# Patient Record
Sex: FEMALE | Race: ASIAN | NOT HISPANIC OR LATINO | URBAN - METROPOLITAN AREA
[De-identification: names, ages, dates, MRNs, and addresses within clinical notes are randomized per-mention and may not be internally consistent; named-entity substitution may affect disease eponyms.]

---

## 2019-10-29 ENCOUNTER — EMERGENCY (EMERGENCY)
Facility: HOSPITAL | Age: 24
LOS: 1 days | Discharge: HOME | End: 2019-10-29
Attending: EMERGENCY MEDICINE
Payer: COMMERCIAL

## 2019-10-29 VITALS
OXYGEN SATURATION: 100 % | HEART RATE: 82 BPM | TEMPERATURE: 99 F | SYSTOLIC BLOOD PRESSURE: 133 MMHG | DIASTOLIC BLOOD PRESSURE: 90 MMHG | RESPIRATION RATE: 20 BRPM

## 2019-10-29 VITALS
DIASTOLIC BLOOD PRESSURE: 79 MMHG | HEIGHT: 63 IN | SYSTOLIC BLOOD PRESSURE: 121 MMHG | HEART RATE: 88 BPM | TEMPERATURE: 98 F | RESPIRATION RATE: 20 BRPM | OXYGEN SATURATION: 99 % | WEIGHT: 154.98 LBS

## 2019-10-29 PROCEDURE — 99283 EMERGENCY DEPT VISIT LOW MDM: CPT

## 2019-10-29 RX ADMIN — Medication 50 MILLIGRAM(S): at 08:09

## 2019-10-29 NOTE — ED PROVIDER NOTE - PATIENT PORTAL LINK FT
You can access the FollowMyHealth Patient Portal offered by [facility] by registering at the following website: [email or web address]. By joining [employer]’s FollowMyHealth portal, you will also be able to view your health information using other applications (apps) compatible with our system.

## 2019-10-29 NOTE — ED PROVIDER NOTE - NSFOLLOWUPINSTRUCTIONS_ED_ALL_ED_FT
1. You were seen in the Emergency Room for rash  2. You may take Prednisone 50 mg once a day for 4 more days (you received one dose in the ER, to complete a 5 day course)  3. Please follow up with your doctor in 24-48 hours. If symptoms do not improve, please follow up with a dermatologist (referral provided to you)  4. Return to the emergency room or seek immediate assistance for any new or concerning symptoms (such as fevers, chills,  worsening rash, difficulty breathing, shortness of breath, hoarse voice ), or if you get worse.   5. Copies of your tests were provided to you for follow-up.  You must address all your findings with your doctor.

## 2019-10-29 NOTE — ED PROVIDER NOTE - CONDITION AT DISCHARGE:
"Requested Prescriptions   Pending Prescriptions Disp Refills     SUMAtriptan (IMITREX) 50 MG tablet [Pharmacy Med Name: SUMATRIPTAN 50MG TABLETS]    Last Written Prescription Date:  12/1/2017  Last Fill Quantity: 18,  # refills: 3   Last office visit: 12/1/2017 with prescribing provider:  Linda Martin     Future Office Visit:     18 tablet 0     Sig: TAKE 1 TO 2 TABLETS(50  MG) BY MOUTH AT ONSET OF HEADACHE FOR MIGRAINE. MAY REPEAT IN 2 HOURS. MAX 4 TABLETS/ 24 HOURS    Serotonin Agonists Failed - 12/19/2018  3:00 PM       Failed - Serotonin Agonist request needs review.    Please review patient's record. If patient has had 8 or more treatments in the past month, please forward to provider.         Failed - Recent (12 mo) or future (30 days) visit within the authorizing provider's specialty    Patient had office visit in the last 12 months or has a visit in the next 30 days with authorizing provider or within the authorizing provider's specialty.  See \"Patient Info\" tab in inbasket, or \"Choose Columns\" in Meds & Orders section of the refill encounter.             Passed - Blood pressure under 140/90 in past 12 months    BP Readings from Last 3 Encounters:   02/23/18 124/82 (85 %/ 96 %)*   12/01/17 124/70 (86 %/ 70 %)*   11/28/17 (!) 139/96 (99 %/ >99 %)*     *BP percentiles are based on the August 2017 AAP Clinical Practice Guideline for girls                Passed - Patient is age 18 or older       Passed - No active pregnancy on record       Passed - No positive pregnancy test in past 12 months          " Improved

## 2019-10-29 NOTE — ED PROVIDER NOTE - PHYSICAL EXAMINATION
PHYSICAL EXAM:   General: well-appearing, appears stated age, in no acute distress  HEENT: NC/AT, airway patent  Cardiovascular: regular rate   Respiratory: nonlabored respirations  Abdominal:  nondistended,   Back: see below  Extremities: see below  Neuro: Alert and oriented x3. Nonfocal neurologic exam  Psychiatric: appropriate mood and affect.   Skin: approx 1-2 cm raised irregular blanching erythematous indurated raised lesions, clustered on right lower back and scattered on left lower back, one lesion on inside of R upper arm and one on inside of L upper arm. No areas on stomach affected - rash does not wrap around from back to front. Skin NOT sensitive/burning to the touch  -Verena Wong PGY-2

## 2019-10-29 NOTE — ED PROVIDER NOTE - OBJECTIVE STATEMENT
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 Took Benadryl Sunday and felt as though it made it worse. No fevers. No recent travel. No cough/no congestion. No new detergents. No allergies. No new foods. No sick contact. 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 Took Benadryl Sunday and felt as though it made it worse. No fevers. No recent travel. No cough/no congestion. No new detergents. No allergies. No new foods. No sick contact. No patient exposures with rash

## 2019-10-29 NOTE — ED PROVIDER NOTE - NSFOLLOWUPCLINICS_GEN_ALL_ED_FT
[facility]  Dermatology  [address] [place] [id number]  Phone: [phone]  Fax: [phone]  Follow Up Time: 1-3 Days

## 2019-10-29 NOTE — ED ADULT NURSE NOTE - OBJECTIVE STATEMENT
24 y.o. Female presents to the ED c/o pruritic rash since Sunday. Pt reports noticing pruritic rash on Sunday - tried benadryl with no relief. Raised redness rash noted on b/l mid to lower back. Pt reports noticing rash on arms b/l today - single red circular spot noted on L upper arm and R upper arm - pruritic. Denies pain, usage of new body wash, detergent, travel, sick contacts, camping, new foods, allergies. Denies CP, SOB, N/V/D, urinary/bowel complications, fever/chills. A&Ox3. Ambulatory. Pt is in no current distress. Comfort and safety provided. Will continue to monitor. Dr. Marques and Dr. Wong at bedside for assessment.

## 2019-10-29 NOTE — ED PROVIDER NOTE - CLINICAL SUMMARY MEDICAL DECISION MAKING FREE TEXT BOX
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 Took benadryl without relief. No associated symptoms. DDx includes but not limited to allergic reaction vs ptoryiasis (although lesions a bit large for that), shingles (less likley without burning/sensitive skin, crosses midline on both arms). Will give course of steroids, and derm follow up if symptoms do not improve. 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 Took benadryl without relief. No associated symptoms. DDx includes but not limited to allergic reaction vs pityriasis (although lesions a bit large for that), shingles (less likley without burning/sensitive skin, crosses midline on both arms). Will give course of steroids, and derm follow up if symptoms do not improve.

## 2019-11-11 PROBLEM — Z78.9 OTHER SPECIFIED HEALTH STATUS: Chronic | Status: ACTIVE | Noted: 2019-10-29

## 2019-11-12 ENCOUNTER — LABORATORY RESULT (OUTPATIENT)
Age: 24
End: 2019-11-12

## 2019-11-12 ENCOUNTER — APPOINTMENT (OUTPATIENT)
Dept: DERMATOLOGY | Facility: CLINIC | Age: 24
End: 2019-11-12
Payer: COMMERCIAL

## 2019-11-12 VITALS — BODY MASS INDEX: 26.58 KG/M2 | WEIGHT: 150 LBS | HEIGHT: 63 IN

## 2019-11-12 DIAGNOSIS — R23.8 OTHER SKIN CHANGES: ICD-10-CM

## 2019-11-12 PROBLEM — Z00.00 ENCOUNTER FOR PREVENTIVE HEALTH EXAMINATION: Status: ACTIVE | Noted: 2019-11-12

## 2019-11-12 PROCEDURE — 99203 OFFICE O/P NEW LOW 30 MIN: CPT | Mod: 25

## 2019-11-12 PROCEDURE — 11104 PUNCH BX SKIN SINGLE LESION: CPT

## 2019-11-12 PROCEDURE — 11900 INJECT SKIN LESIONS </W 7: CPT

## 2019-11-12 RX ORDER — BETAMETHASONE DIPROPIONATE 0.5 MG/G
0.05 OINTMENT, AUGMENTED TOPICAL
Qty: 1 | Refills: 0 | Status: ACTIVE | COMMUNITY
Start: 2019-11-12 | End: 1900-01-01

## 2019-11-25 ENCOUNTER — APPOINTMENT (OUTPATIENT)
Dept: DERMATOLOGY | Facility: CLINIC | Age: 24
End: 2019-11-25
Payer: COMMERCIAL

## 2019-11-25 PROCEDURE — 99214 OFFICE O/P EST MOD 30 MIN: CPT | Mod: GC

## 2019-11-25 RX ORDER — CYCLOSPORINE 100 MG/1
100 CAPSULE, LIQUID FILLED ORAL
Qty: 42 | Refills: 0 | Status: ACTIVE | COMMUNITY
Start: 2019-11-25 | End: 1900-01-01

## 2019-11-29 LAB
ALBUMIN SERPL ELPH-MCNC: 4.4 G/DL
ALP BLD-CCNC: 77 U/L
ALT SERPL-CCNC: 8 U/L
ANION GAP SERPL CALC-SCNC: 13 MMOL/L
AST SERPL-CCNC: 13 U/L
BASOPHILS # BLD AUTO: 0.05 K/UL
BASOPHILS NFR BLD AUTO: 0.5 %
BILIRUB SERPL-MCNC: <0.2 MG/DL
BUN SERPL-MCNC: 18 MG/DL
CALCIUM SERPL-MCNC: 9.4 MG/DL
CHLORIDE SERPL-SCNC: 103 MMOL/L
CO2 SERPL-SCNC: 23 MMOL/L
CREAT SERPL-MCNC: 0.89 MG/DL
EOSINOPHIL # BLD AUTO: 0.17 K/UL
EOSINOPHIL NFR BLD AUTO: 1.7 %
GLUCOSE SERPL-MCNC: 98 MG/DL
HBV CORE IGG+IGM SER QL: NONREACTIVE
HBV SURFACE AB SER QL: ABNORMAL
HBV SURFACE AG SER QL: NONREACTIVE
HCT VFR BLD CALC: 40 %
HCV AB SER QL: NONREACTIVE
HCV S/CO RATIO: 0.11 S/CO
HGB BLD-MCNC: 13.1 G/DL
IMM GRANULOCYTES NFR BLD AUTO: 0.5 %
LYMPHOCYTES # BLD AUTO: 3.04 K/UL
LYMPHOCYTES NFR BLD AUTO: 30.2 %
M TB IFN-G BLD-IMP: NEGATIVE
MAGNESIUM SERPL-MCNC: 2.1 MG/DL
MAN DIFF?: NORMAL
MCHC RBC-ENTMCNC: 29.9 PG
MCHC RBC-ENTMCNC: 32.8 GM/DL
MCV RBC AUTO: 91.3 FL
MONOCYTES # BLD AUTO: 0.53 K/UL
MONOCYTES NFR BLD AUTO: 5.3 %
NEUTROPHILS # BLD AUTO: 6.23 K/UL
NEUTROPHILS NFR BLD AUTO: 61.8 %
PLATELET # BLD AUTO: 283 K/UL
POTASSIUM SERPL-SCNC: 4 MMOL/L
PROT SERPL-MCNC: 6.8 G/DL
QUANTIFERON TB PLUS MITOGEN MINUS NIL: 6.75 IU/ML
QUANTIFERON TB PLUS NIL: 0.01 IU/ML
QUANTIFERON TB PLUS TB1 MINUS NIL: 0.01 IU/ML
QUANTIFERON TB PLUS TB2 MINUS NIL: 0.01 IU/ML
RBC # BLD: 4.38 M/UL
RBC # FLD: 12.3 %
SODIUM SERPL-SCNC: 139 MMOL/L
TRIGL SERPL-MCNC: 63 MG/DL
WBC # FLD AUTO: 10.07 K/UL

## 2019-12-02 LAB — IGE SER-MCNC: 45 KU/L

## 2019-12-09 ENCOUNTER — APPOINTMENT (OUTPATIENT)
Dept: DERMATOLOGY | Facility: CLINIC | Age: 24
End: 2019-12-09
Payer: COMMERCIAL

## 2019-12-09 VITALS — SYSTOLIC BLOOD PRESSURE: 120 MMHG | DIASTOLIC BLOOD PRESSURE: 80 MMHG

## 2019-12-09 DIAGNOSIS — L30.9 DERMATITIS, UNSPECIFIED: ICD-10-CM

## 2019-12-09 PROCEDURE — 99213 OFFICE O/P EST LOW 20 MIN: CPT | Mod: GC

## 2019-12-23 ENCOUNTER — APPOINTMENT (OUTPATIENT)
Dept: DERMATOLOGY | Facility: CLINIC | Age: 24
End: 2019-12-23

## 2019-12-30 ENCOUNTER — RESULT REVIEW (OUTPATIENT)
Age: 24
End: 2019-12-30

## 2020-03-02 ENCOUNTER — TRANSCRIPTION ENCOUNTER (OUTPATIENT)
Age: 25
End: 2020-03-02

## 2020-04-26 ENCOUNTER — MESSAGE (OUTPATIENT)
Age: 25
End: 2020-04-26

## 2020-05-03 ENCOUNTER — APPOINTMENT (OUTPATIENT)
Dept: DISASTER EMERGENCY | Facility: HOSPITAL | Age: 25
End: 2020-05-03

## 2020-05-04 LAB
SARS-COV-2 IGG SERPL IA-ACNC: 1.7 INDEX
SARS-COV-2 IGG SERPL QL IA: POSITIVE

## 2022-01-12 ENCOUNTER — RESULT REVIEW (OUTPATIENT)
Age: 27
End: 2022-01-12

## 2022-10-26 ENCOUNTER — RESULT REVIEW (OUTPATIENT)
Age: 27
End: 2022-10-26

## 2023-01-11 ENCOUNTER — APPOINTMENT (OUTPATIENT)
Dept: OBGYN | Facility: CLINIC | Age: 28
End: 2023-01-11
Payer: COMMERCIAL

## 2023-01-11 ENCOUNTER — ASOB RESULT (OUTPATIENT)
Age: 28
End: 2023-01-11

## 2023-01-11 ENCOUNTER — NON-APPOINTMENT (OUTPATIENT)
Age: 28
End: 2023-01-11

## 2023-01-11 VITALS
BODY MASS INDEX: 29.06 KG/M2 | DIASTOLIC BLOOD PRESSURE: 78 MMHG | SYSTOLIC BLOOD PRESSURE: 120 MMHG | HEIGHT: 63 IN | WEIGHT: 164 LBS

## 2023-01-11 DIAGNOSIS — Z3A.16 16 WEEKS GESTATION OF PREGNANCY: ICD-10-CM

## 2023-01-11 PROCEDURE — 0500F INITIAL PRENATAL CARE VISIT: CPT

## 2023-01-11 PROCEDURE — 36415 COLL VENOUS BLD VENIPUNCTURE: CPT

## 2023-01-24 LAB
AFP MOM: 0.83
AFP VALUE: 29.6 NG/ML
ALPHA FETOPROTEIN SERUM COMMENT: NORMAL
ALPHA FETOPROTEIN SERUM INTERPRETATION: NORMAL
ALPHA FETOPROTEIN SERUM RESULTS: NORMAL
ALPHA FETOPROTEIN SERUM TEST RESULTS: NORMAL
GESTATIONAL AGE BASED ON: NORMAL
GESTATIONAL AGE ON COLLECTION DATE: 16.6 WEEKS
INSULIN DEP DIABETES: NO
MATERNAL AGE AT EDD AFP: 30.3 YR
MULTIPLE GESTATION: NO
OSBR RISK 1 IN: NORMAL
RACE: NORMAL
WEIGHT AFP: 164 LBS

## 2023-02-08 ENCOUNTER — APPOINTMENT (OUTPATIENT)
Dept: OBGYN | Facility: CLINIC | Age: 28
End: 2023-02-08
Payer: COMMERCIAL

## 2023-02-08 ENCOUNTER — NON-APPOINTMENT (OUTPATIENT)
Age: 28
End: 2023-02-08

## 2023-02-08 ENCOUNTER — ASOB RESULT (OUTPATIENT)
Age: 28
End: 2023-02-08

## 2023-02-08 VITALS
HEIGHT: 63 IN | DIASTOLIC BLOOD PRESSURE: 71 MMHG | WEIGHT: 166 LBS | SYSTOLIC BLOOD PRESSURE: 106 MMHG | BODY MASS INDEX: 29.41 KG/M2

## 2023-02-08 DIAGNOSIS — Z3A.20 20 WEEKS GESTATION OF PREGNANCY: ICD-10-CM

## 2023-02-08 PROCEDURE — 0502F SUBSEQUENT PRENATAL CARE: CPT

## 2023-02-08 PROCEDURE — 76805 OB US >/= 14 WKS SNGL FETUS: CPT

## 2023-02-08 PROCEDURE — 36415 COLL VENOUS BLD VENIPUNCTURE: CPT

## 2023-02-09 LAB — T PALLIDUM AB SER QL IA: NEGATIVE

## 2023-02-10 LAB
ALBUMIN SERPL ELPH-MCNC: 4 G/DL
ALP BLD-CCNC: 70 U/L
ALT SERPL-CCNC: 86 U/L
ANION GAP SERPL CALC-SCNC: 16 MMOL/L
AST SERPL-CCNC: 65 U/L
BILIRUB SERPL-MCNC: <0.2 MG/DL
BUN SERPL-MCNC: 9 MG/DL
CALCIUM SERPL-MCNC: 9.2 MG/DL
CHLORIDE SERPL-SCNC: 101 MMOL/L
CO2 SERPL-SCNC: 20 MMOL/L
CREAT SERPL-MCNC: 0.55 MG/DL
EGFR: 127 ML/MIN/1.73M2
GLUCOSE SERPL-MCNC: 50 MG/DL
HCV AB SER QL: NONREACTIVE
HCV S/CO RATIO: 0.08 S/CO
LEAD BLD-MCNC: <1 UG/DL
POTASSIUM SERPL-SCNC: 4.2 MMOL/L
PROT SERPL-MCNC: 6.7 G/DL
SODIUM SERPL-SCNC: 138 MMOL/L

## 2023-02-25 ENCOUNTER — NON-APPOINTMENT (OUTPATIENT)
Age: 28
End: 2023-02-25

## 2023-02-25 LAB
ALBUMIN SERPL ELPH-MCNC: 4.2 G/DL
ALP BLD-CCNC: 66 U/L
ALT SERPL-CCNC: 55 U/L
ANION GAP SERPL CALC-SCNC: 13 MMOL/L
AST SERPL-CCNC: 35 U/L
BILIRUB SERPL-MCNC: <0.2 MG/DL
BUN SERPL-MCNC: 7 MG/DL
CALCIUM SERPL-MCNC: 9.6 MG/DL
CHLORIDE SERPL-SCNC: 101 MMOL/L
CO2 SERPL-SCNC: 22 MMOL/L
CREAT SERPL-MCNC: 0.58 MG/DL
EGFR: 125 ML/MIN/1.73M2
GLUCOSE SERPL-MCNC: 76 MG/DL
POTASSIUM SERPL-SCNC: 4.4 MMOL/L
PROT SERPL-MCNC: 6.4 G/DL
SODIUM SERPL-SCNC: 136 MMOL/L

## 2023-02-27 ENCOUNTER — NON-APPOINTMENT (OUTPATIENT)
Age: 28
End: 2023-02-27

## 2023-03-08 ENCOUNTER — TRANSCRIPTION ENCOUNTER (OUTPATIENT)
Age: 28
End: 2023-03-08

## 2023-03-08 ENCOUNTER — APPOINTMENT (OUTPATIENT)
Dept: OBGYN | Facility: CLINIC | Age: 28
End: 2023-03-08
Payer: COMMERCIAL

## 2023-03-08 VITALS
WEIGHT: 169 LBS | SYSTOLIC BLOOD PRESSURE: 118 MMHG | BODY MASS INDEX: 29.95 KG/M2 | HEIGHT: 63 IN | DIASTOLIC BLOOD PRESSURE: 76 MMHG

## 2023-03-08 DIAGNOSIS — R74.01 ELEVATION OF LEVELS OF LIVER TRANSAMINASE LEVELS: ICD-10-CM

## 2023-03-08 DIAGNOSIS — Z3A.24 24 WEEKS GESTATION OF PREGNANCY: ICD-10-CM

## 2023-03-08 PROCEDURE — 36415 COLL VENOUS BLD VENIPUNCTURE: CPT

## 2023-03-08 PROCEDURE — 0502F SUBSEQUENT PRENATAL CARE: CPT

## 2023-03-09 ENCOUNTER — NON-APPOINTMENT (OUTPATIENT)
Age: 28
End: 2023-03-09

## 2023-03-09 LAB
ALBUMIN SERPL ELPH-MCNC: 3.9 G/DL
ALP BLD-CCNC: 77 U/L
ALT SERPL-CCNC: 31 U/L
ANION GAP SERPL CALC-SCNC: 17 MMOL/L
AST SERPL-CCNC: 29 U/L
BILIRUB SERPL-MCNC: <0.2 MG/DL
BUN SERPL-MCNC: 8 MG/DL
CALCIUM SERPL-MCNC: 9.4 MG/DL
CHLORIDE SERPL-SCNC: 100 MMOL/L
CO2 SERPL-SCNC: 20 MMOL/L
CREAT SERPL-MCNC: 0.56 MG/DL
EGFR: 126 ML/MIN/1.73M2
GLUCOSE SERPL-MCNC: 57 MG/DL
POTASSIUM SERPL-SCNC: 4.1 MMOL/L
PROT SERPL-MCNC: 6.4 G/DL
SODIUM SERPL-SCNC: 138 MMOL/L

## 2023-03-20 ENCOUNTER — APPOINTMENT (OUTPATIENT)
Dept: ANTEPARTUM | Facility: CLINIC | Age: 28
End: 2023-03-20
Payer: COMMERCIAL

## 2023-03-20 ENCOUNTER — NON-APPOINTMENT (OUTPATIENT)
Age: 28
End: 2023-03-20

## 2023-03-20 ENCOUNTER — OUTPATIENT (OUTPATIENT)
Dept: OUTPATIENT SERVICES | Facility: HOSPITAL | Age: 28
LOS: 1 days | End: 2023-03-20
Payer: COMMERCIAL

## 2023-03-20 ENCOUNTER — ASOB RESULT (OUTPATIENT)
Age: 28
End: 2023-03-20

## 2023-03-20 VITALS — DIASTOLIC BLOOD PRESSURE: 76 MMHG | HEART RATE: 98 BPM | SYSTOLIC BLOOD PRESSURE: 125 MMHG

## 2023-03-20 VITALS — TEMPERATURE: 98 F | SYSTOLIC BLOOD PRESSURE: 99 MMHG | HEART RATE: 93 BPM | DIASTOLIC BLOOD PRESSURE: 69 MMHG

## 2023-03-20 DIAGNOSIS — O26.899 OTHER SPECIFIED PREGNANCY RELATED CONDITIONS, UNSPECIFIED TRIMESTER: ICD-10-CM

## 2023-03-20 LAB
APPEARANCE UR: CLEAR — SIGNIFICANT CHANGE UP
BACTERIA # UR AUTO: NEGATIVE — SIGNIFICANT CHANGE UP
BILIRUB UR-MCNC: NEGATIVE — SIGNIFICANT CHANGE UP
COLOR SPEC: COLORLESS — SIGNIFICANT CHANGE UP
DIFF PNL FLD: NEGATIVE — SIGNIFICANT CHANGE UP
EPI CELLS # UR: 1 /HPF — SIGNIFICANT CHANGE UP
GLUCOSE UR QL: NEGATIVE — SIGNIFICANT CHANGE UP
HYALINE CASTS # UR AUTO: 0 /LPF — SIGNIFICANT CHANGE UP (ref 0–2)
KETONES UR-MCNC: NEGATIVE — SIGNIFICANT CHANGE UP
LEUKOCYTE ESTERASE UR-ACNC: NEGATIVE — SIGNIFICANT CHANGE UP
NITRITE UR-MCNC: NEGATIVE — SIGNIFICANT CHANGE UP
PH UR: 7 — SIGNIFICANT CHANGE UP (ref 5–8)
PROT UR-MCNC: NEGATIVE — SIGNIFICANT CHANGE UP
RBC CASTS # UR COMP ASSIST: 1 /HPF — SIGNIFICANT CHANGE UP (ref 0–4)
SP GR SPEC: 1.01 — LOW (ref 1.01–1.02)
UROBILINOGEN FLD QL: NEGATIVE — SIGNIFICANT CHANGE UP
WBC UR QL: 1 /HPF — SIGNIFICANT CHANGE UP (ref 0–5)

## 2023-03-20 PROCEDURE — 76818 FETAL BIOPHYS PROFILE W/NST: CPT | Mod: 26,59

## 2023-03-20 PROCEDURE — 99221 1ST HOSP IP/OBS SF/LOW 40: CPT

## 2023-03-20 PROCEDURE — G0378: CPT

## 2023-03-20 PROCEDURE — 59025 FETAL NON-STRESS TEST: CPT | Mod: 26

## 2023-03-20 PROCEDURE — 81001 URINALYSIS AUTO W/SCOPE: CPT

## 2023-03-20 PROCEDURE — ZZZZZ: CPT

## 2023-03-20 PROCEDURE — 87086 URINE CULTURE/COLONY COUNT: CPT

## 2023-03-20 PROCEDURE — G0463: CPT

## 2023-03-20 PROCEDURE — 76816 OB US FOLLOW-UP PER FETUS: CPT | Mod: 26

## 2023-03-20 NOTE — OB PROVIDER TRIAGE NOTE - NSHPPHYSICALEXAM_GEN_ALL_CORE
ICU Vital Signs Last 24 Hrs  T(C): 37 (20 Mar 2023 12:34), Max: 37.0 (20 Mar 2023 12:26)  T(F): 98.6 (20 Mar 2023 12:34), Max: 98.6 (20 Mar 2023 12:26)  HR: 89 (20 Mar 2023 13:05) (87 - 101)  BP: 125/76 (20 Mar 2023 12:34) (125/76 - 125/76)  BP(mean): --  ABP: --  ABP(mean): --  RR: 18 (20 Mar 2023 12:34) (18 - 18)  SpO2: 100% (20 Mar 2023 13:05) (94% - 100%)    O2 Parameters below as of 20 Mar 2023 12:34  Patient On (Oxygen Delivery Method): room air        gen A&Ox3  CV: rrr s1s2  abd: gravid soft  VE: ICU Vital Signs Last 24 Hrs  T(C): 37 (20 Mar 2023 12:34), Max: 37.0 (20 Mar 2023 12:26)  T(F): 98.6 (20 Mar 2023 12:34), Max: 98.6 (20 Mar 2023 12:26)  HR: 89 (20 Mar 2023 13:05) (87 - 101)  BP: 125/76 (20 Mar 2023 12:34) (125/76 - 125/76)  BP(mean): --  ABP: --  ABP(mean): --  RR: 18 (20 Mar 2023 12:34) (18 - 18)  SpO2: 100% (20 Mar 2023 13:05) (94% - 100%)    O2 Parameters below as of 20 Mar 2023 12:34  Patient On (Oxygen Delivery Method): room air        gen A&Ox3  CV: rrr s1s2  abd: gravid soft    Addendum exam performed by Dr. Holder  cervical length: 2.36-2.58  VE: closed/long/-3 however noted to be soft vertex presentation ICU Vital Signs Last 24 Hrs  T(C): 37 (20 Mar 2023 12:34), Max: 37.0 (20 Mar 2023 12:26)  T(F): 98.6 (20 Mar 2023 12:34), Max: 98.6 (20 Mar 2023 12:26)  HR: 89 (20 Mar 2023 13:05) (87 - 101)  BP: 125/76 (20 Mar 2023 12:34) (125/76 - 125/76)  BP(mean): --  ABP: --  ABP(mean): --  RR: 18 (20 Mar 2023 12:34) (18 - 18)  SpO2: 100% (20 Mar 2023 13:05) (94% - 100%)    O2 Parameters below as of 20 Mar 2023 12:34  Patient On (Oxygen Delivery Method): room air        gen A&Ox3  CV: rrr s1s2  abd: gravid soft    Addendum exam performed by Dr. Holder  cervical length: 2.36-2.58  VE: closed/long/-3 however noted to be soft vertex presentation    Addendum @ 4p:    Sonogram performed again by [facility], case also evaluated by [facility] for the possibility of starting vaginal progesterone    Repeat cervical length: 3.2    Urinalysis Basic - ( 20 Mar 2023 13:22 )    Color: Colorless / Appearance: Clear / S.008 / pH: x  Gluc: x / Ketone: Negative  / Bili: Negative / Urobili: Negative   Blood: x / Protein: Negative / Nitrite: Negative   Leuk Esterase: Negative / RBC: 1 /hpf / WBC 1 /HPF   Sq Epi: x / Non Sq Epi: 1 /hpf / Bacteria: Negative

## 2023-03-20 NOTE — OB PROVIDER TRIAGE NOTE - NSOBPROVIDERNOTE_OBGYN_ALL_OB_FT
27 y/o  @ 26.2 weeks presenting to L&D with vaginal pressure, will consider PTL vs. possible UTI vs. normal pregnancy course  -EFM/toco  -U/A, urinalysis    Samantha Dixon NP 27 y/o  @ 26.2 weeks presenting to L&D with vaginal pressure, will consider PTL vs. possible UTI vs. normal pregnancy course  -EFM/toco  -U/A, urinalysis    Samantha Dixon NP    addendum:   Will obtain an official sonogram   M consult for possibility of vaginal progesterone  patient to be out of work x 2 weeks    d/w Dr. Tony Dixon NP 29 y/o  @ 26.2 weeks presenting to L&D with vaginal pressure, will consider PTL vs. possible UTI vs. normal pregnancy course  -EFM/toco  -U/A, urinalysis    Samantha Dixon NP    addendum:   Will obtain an official sonogram   [facility] consult for possibility of vaginal progesterone  patient to be out of work x 1 week    d/w Dr. Tony Dixon NP    Addendum @4p  The patient was evaluated by Dr. Hirschberg [facility] fellow, found to not need vaginal progesterone as this time for a cervical length of 3.2.  The patient was additionally instructed to not work for 1 week and return to the office on 3/27.  Urinalysis negative for a UTI

## 2023-03-20 NOTE — OB RN TRIAGE NOTE - FALL HARM RISK - UNIVERSAL INTERVENTIONS
Bed in lowest position, wheels locked, appropriate side rails in place/Call bell, personal items and telephone in reach/Instruct patient to call for assistance before getting out of bed or chair/Non-slip footwear when patient is out of bed/[place] to call system/Physically safe environment - no spills, clutter or unnecessary equipment/Purposeful Proactive Rounding/Room/bathroom lighting operational, light cord in reach

## 2023-03-20 NOTE — OB PROVIDER TRIAGE NOTE - ADDITIONAL INSTRUCTIONS
The patient was instructed to contact her MD for decreased fetal movement, leakage of fluids, vaginal bleeding, increased vaginal pressure or consistent cramping pain. The patient to have an office visit on 3/27 and stay out of work until this time.

## 2023-03-20 NOTE — OB PROVIDER TRIAGE NOTE - HISTORY OF PRESENT ILLNESS
The patient is a 29 y/o  EDC 2023 @ 26.2 weeks presenting to L&D with 2 weeks of vaginal pressure when ambulating. The patient reports an increase in the symptoms of vaginal pressure yesterday and today. The patient denies vaginal bleeding, LOF. endorses good fetal movement. The patient accepts all blood products    allergies: nkda  meds: PNV  PNC: uncomplicated    Medhx: pt denies cardiac, pulm, heme, GI, neuro  OBhx: current  Sxhx: pt denies   Gynhx: pt denies cysts, fibroids, abn. pap, STDs  Sochx: pt denies  Famhx: pt denies

## 2023-03-21 LAB
CULTURE RESULTS: SIGNIFICANT CHANGE UP
SPECIMEN SOURCE: SIGNIFICANT CHANGE UP

## 2023-03-22 DIAGNOSIS — O26.892 OTHER SPECIFIED PREGNANCY RELATED CONDITIONS, SECOND TRIMESTER: ICD-10-CM

## 2023-03-22 DIAGNOSIS — R10.2 PELVIC AND PERINEAL PAIN: ICD-10-CM

## 2023-03-22 DIAGNOSIS — Z3A.26 26 WEEKS GESTATION OF PREGNANCY: ICD-10-CM

## 2023-03-27 ENCOUNTER — NON-APPOINTMENT (OUTPATIENT)
Age: 28
End: 2023-03-27

## 2023-03-27 ENCOUNTER — APPOINTMENT (OUTPATIENT)
Dept: OBGYN | Facility: CLINIC | Age: 28
End: 2023-03-27
Payer: COMMERCIAL

## 2023-03-27 VITALS — WEIGHT: 171 LBS | DIASTOLIC BLOOD PRESSURE: 71 MMHG | BODY MASS INDEX: 30.29 KG/M2 | SYSTOLIC BLOOD PRESSURE: 120 MMHG

## 2023-03-27 DIAGNOSIS — R10.2 OTHER SPECIFIED PREGNANCY RELATED CONDITIONS, UNSPECIFIED TRIMESTER: ICD-10-CM

## 2023-03-27 DIAGNOSIS — Z3A.27 27 WEEKS GESTATION OF PREGNANCY: ICD-10-CM

## 2023-03-27 DIAGNOSIS — O26.899 OTHER SPECIFIED PREGNANCY RELATED CONDITIONS, UNSPECIFIED TRIMESTER: ICD-10-CM

## 2023-03-27 PROCEDURE — 0502F SUBSEQUENT PRENATAL CARE: CPT

## 2023-03-29 NOTE — OB RN TRIAGE NOTE - NSLDARRIVAL_OBGYN_ALL_OB_START_DATE
20-Mar-2023 12:20 Advancement Flap (Single) Text: The defect edges were debeveled with a #15 scalpel blade.  Given the location of the defect and the proximity to free margins a single advancement flap was deemed most appropriate.  Using a sterile surgical marker, an appropriate advancement flap was drawn incorporating the defect and placing the expected incisions within the relaxed skin tension lines where possible.    The area thus outlined was incised deep to adipose tissue with a #15 scalpel blade.  The skin margins were undermined to an appropriate distance in all directions utilizing iris scissors.

## 2023-04-07 ENCOUNTER — NON-APPOINTMENT (OUTPATIENT)
Age: 28
End: 2023-04-07

## 2023-04-07 ENCOUNTER — APPOINTMENT (OUTPATIENT)
Dept: OBGYN | Facility: CLINIC | Age: 28
End: 2023-04-07
Payer: COMMERCIAL

## 2023-04-07 ENCOUNTER — MED ADMIN CHARGE (OUTPATIENT)
Age: 28
End: 2023-04-07

## 2023-04-07 ENCOUNTER — ASOB RESULT (OUTPATIENT)
Age: 28
End: 2023-04-07

## 2023-04-07 VITALS — BODY MASS INDEX: 30.65 KG/M2 | DIASTOLIC BLOOD PRESSURE: 71 MMHG | WEIGHT: 173 LBS | SYSTOLIC BLOOD PRESSURE: 114 MMHG

## 2023-04-07 DIAGNOSIS — Z23 ENCOUNTER FOR IMMUNIZATION: ICD-10-CM

## 2023-04-07 DIAGNOSIS — Z3A.28 28 WEEKS GESTATION OF PREGNANCY: ICD-10-CM

## 2023-04-07 PROCEDURE — 0502F SUBSEQUENT PRENATAL CARE: CPT

## 2023-04-07 PROCEDURE — 76816 OB US FOLLOW-UP PER FETUS: CPT

## 2023-04-07 PROCEDURE — 36415 COLL VENOUS BLD VENIPUNCTURE: CPT

## 2023-04-10 LAB
BASOPHILS # BLD AUTO: 0.04 K/UL
BASOPHILS NFR BLD AUTO: 0.4 %
BLD GP AB SCN SERPL QL: NORMAL
EOSINOPHIL # BLD AUTO: 0.08 K/UL
EOSINOPHIL NFR BLD AUTO: 0.8 %
GLUCOSE 1H P 50 G GLC PO SERPL-MCNC: 125 MG/DL
HCT VFR BLD CALC: 34.1 %
HGB BLD-MCNC: 11.1 G/DL
HIV1+2 AB SPEC QL IA.RAPID: NONREACTIVE
IMM GRANULOCYTES NFR BLD AUTO: 0.9 %
LYMPHOCYTES # BLD AUTO: 1.64 K/UL
LYMPHOCYTES NFR BLD AUTO: 15.5 %
MAN DIFF?: NORMAL
MCHC RBC-ENTMCNC: 30.1 PG
MCHC RBC-ENTMCNC: 32.6 GM/DL
MCV RBC AUTO: 92.4 FL
MONOCYTES # BLD AUTO: 0.5 K/UL
MONOCYTES NFR BLD AUTO: 4.7 %
NEUTROPHILS # BLD AUTO: 8.21 K/UL
NEUTROPHILS NFR BLD AUTO: 77.7 %
PLATELET # BLD AUTO: 229 K/UL
RBC # BLD: 3.69 M/UL
RBC # FLD: 13.4 %
WBC # FLD AUTO: 10.56 K/UL

## 2023-05-03 ENCOUNTER — APPOINTMENT (OUTPATIENT)
Dept: OBGYN | Facility: CLINIC | Age: 28
End: 2023-05-03
Payer: COMMERCIAL

## 2023-05-03 ENCOUNTER — ASOB RESULT (OUTPATIENT)
Age: 28
End: 2023-05-03

## 2023-05-03 VITALS — DIASTOLIC BLOOD PRESSURE: 71 MMHG | BODY MASS INDEX: 31.35 KG/M2 | WEIGHT: 177 LBS | SYSTOLIC BLOOD PRESSURE: 106 MMHG

## 2023-05-03 DIAGNOSIS — Z3A.32 32 WEEKS GESTATION OF PREGNANCY: ICD-10-CM

## 2023-05-03 PROCEDURE — 0502F SUBSEQUENT PRENATAL CARE: CPT

## 2023-05-03 PROCEDURE — 76816 OB US FOLLOW-UP PER FETUS: CPT

## 2023-05-03 PROCEDURE — 76819 FETAL BIOPHYS PROFIL W/O NST: CPT | Mod: 59

## 2023-05-05 LAB
CANDIDA VAG CYTO: NOT DETECTED
G VAGINALIS+PREV SP MTYP VAG QL MICRO: NOT DETECTED
T VAGINALIS VAG QL WET PREP: NOT DETECTED

## 2023-05-15 ENCOUNTER — APPOINTMENT (OUTPATIENT)
Dept: OBGYN | Facility: CLINIC | Age: 28
End: 2023-05-15
Payer: COMMERCIAL

## 2023-05-15 VITALS
SYSTOLIC BLOOD PRESSURE: 114 MMHG | DIASTOLIC BLOOD PRESSURE: 72 MMHG | WEIGHT: 181 LBS | BODY MASS INDEX: 32.07 KG/M2 | HEIGHT: 63 IN

## 2023-05-15 DIAGNOSIS — Z3A.34 34 WEEKS GESTATION OF PREGNANCY: ICD-10-CM

## 2023-05-15 PROCEDURE — 0502F SUBSEQUENT PRENATAL CARE: CPT

## 2023-05-24 ENCOUNTER — ASOB RESULT (OUTPATIENT)
Age: 28
End: 2023-05-24

## 2023-05-24 ENCOUNTER — APPOINTMENT (OUTPATIENT)
Dept: OBGYN | Facility: CLINIC | Age: 28
End: 2023-05-24
Payer: COMMERCIAL

## 2023-05-24 VITALS — DIASTOLIC BLOOD PRESSURE: 73 MMHG | BODY MASS INDEX: 31.89 KG/M2 | WEIGHT: 180 LBS | SYSTOLIC BLOOD PRESSURE: 108 MMHG

## 2023-05-24 DIAGNOSIS — Z3A.35 35 WEEKS GESTATION OF PREGNANCY: ICD-10-CM

## 2023-05-24 PROCEDURE — 0502F SUBSEQUENT PRENATAL CARE: CPT

## 2023-05-24 PROCEDURE — 76816 OB US FOLLOW-UP PER FETUS: CPT

## 2023-05-24 PROCEDURE — 76819 FETAL BIOPHYS PROFIL W/O NST: CPT | Mod: 59

## 2023-06-05 LAB — B-HEM STREP SPEC QL CULT: NORMAL

## 2023-06-07 ENCOUNTER — APPOINTMENT (OUTPATIENT)
Dept: OBGYN | Facility: CLINIC | Age: 28
End: 2023-06-07
Payer: COMMERCIAL

## 2023-06-07 VITALS — DIASTOLIC BLOOD PRESSURE: 79 MMHG | BODY MASS INDEX: 32.24 KG/M2 | WEIGHT: 182 LBS | SYSTOLIC BLOOD PRESSURE: 119 MMHG

## 2023-06-07 DIAGNOSIS — Z3A.37 37 WEEKS GESTATION OF PREGNANCY: ICD-10-CM

## 2023-06-07 PROCEDURE — 59426 ANTEPARTUM CARE ONLY: CPT

## 2023-06-07 PROCEDURE — 0502F SUBSEQUENT PRENATAL CARE: CPT

## 2023-06-11 ENCOUNTER — OUTPATIENT (OUTPATIENT)
Dept: OUTPATIENT SERVICES | Facility: HOSPITAL | Age: 28
LOS: 1 days | End: 2023-06-11
Payer: COMMERCIAL

## 2023-06-11 ENCOUNTER — INPATIENT (INPATIENT)
Facility: HOSPITAL | Age: 28
LOS: 1 days | Discharge: HOME | End: 2023-06-13
Attending: OBSTETRICS & GYNECOLOGY | Admitting: OBSTETRICS & GYNECOLOGY
Payer: COMMERCIAL

## 2023-06-11 VITALS — TEMPERATURE: 99 F | SYSTOLIC BLOOD PRESSURE: 139 MMHG | HEART RATE: 112 BPM | DIASTOLIC BLOOD PRESSURE: 82 MMHG

## 2023-06-11 VITALS
DIASTOLIC BLOOD PRESSURE: 84 MMHG | OXYGEN SATURATION: 97 % | SYSTOLIC BLOOD PRESSURE: 123 MMHG | HEART RATE: 93 BPM | TEMPERATURE: 98 F | RESPIRATION RATE: 16 BRPM

## 2023-06-11 VITALS — HEART RATE: 83 BPM | SYSTOLIC BLOOD PRESSURE: 124 MMHG | OXYGEN SATURATION: 90 % | DIASTOLIC BLOOD PRESSURE: 78 MMHG

## 2023-06-11 DIAGNOSIS — O26.899 OTHER SPECIFIED PREGNANCY RELATED CONDITIONS, UNSPECIFIED TRIMESTER: ICD-10-CM

## 2023-06-11 DIAGNOSIS — Z34.80 ENCOUNTER FOR SUPERVISION OF OTHER NORMAL PREGNANCY, UNSPECIFIED TRIMESTER: ICD-10-CM

## 2023-06-11 LAB
BASOPHILS # BLD AUTO: 0.03 K/UL — SIGNIFICANT CHANGE UP (ref 0–0.2)
BASOPHILS NFR BLD AUTO: 0.2 % — SIGNIFICANT CHANGE UP (ref 0–2)
BLD GP AB SCN SERPL QL: NEGATIVE — SIGNIFICANT CHANGE UP
EOSINOPHIL # BLD AUTO: 0.01 K/UL — SIGNIFICANT CHANGE UP (ref 0–0.5)
EOSINOPHIL NFR BLD AUTO: 0.1 % — SIGNIFICANT CHANGE UP (ref 0–6)
HBV SURFACE AG SERPL QL IA: SIGNIFICANT CHANGE UP
HCT VFR BLD CALC: 39.3 % — SIGNIFICANT CHANGE UP (ref 34.5–45)
HGB BLD-MCNC: 13.1 G/DL — SIGNIFICANT CHANGE UP (ref 11.5–15.5)
IMM GRANULOCYTES NFR BLD AUTO: 0.7 % — SIGNIFICANT CHANGE UP (ref 0–0.9)
LYMPHOCYTES # BLD AUTO: 1.41 K/UL — SIGNIFICANT CHANGE UP (ref 1–3.3)
LYMPHOCYTES # BLD AUTO: 10.1 % — LOW (ref 13–44)
MCHC RBC-ENTMCNC: 28.7 PG — SIGNIFICANT CHANGE UP (ref 27–34)
MCHC RBC-ENTMCNC: 33.3 GM/DL — SIGNIFICANT CHANGE UP (ref 32–36)
MCV RBC AUTO: 86.2 FL — SIGNIFICANT CHANGE UP (ref 80–100)
MONOCYTES # BLD AUTO: 0.56 K/UL — SIGNIFICANT CHANGE UP (ref 0–0.9)
MONOCYTES NFR BLD AUTO: 4 % — SIGNIFICANT CHANGE UP (ref 2–14)
NEUTROPHILS # BLD AUTO: 11.86 K/UL — HIGH (ref 1.8–7.4)
NEUTROPHILS NFR BLD AUTO: 84.9 % — HIGH (ref 43–77)
NRBC # BLD: 0 /100 WBCS — SIGNIFICANT CHANGE UP (ref 0–0)
PLATELET # BLD AUTO: 177 K/UL — SIGNIFICANT CHANGE UP (ref 150–400)
RBC # BLD: 4.56 M/UL — SIGNIFICANT CHANGE UP (ref 3.8–5.2)
RBC # FLD: 14.1 % — SIGNIFICANT CHANGE UP (ref 10.3–14.5)
RH IG SCN BLD-IMP: POSITIVE — SIGNIFICANT CHANGE UP
RH IG SCN BLD-IMP: POSITIVE — SIGNIFICANT CHANGE UP
WBC # BLD: 13.97 K/UL — HIGH (ref 3.8–10.5)
WBC # FLD AUTO: 13.97 K/UL — HIGH (ref 3.8–10.5)

## 2023-06-11 PROCEDURE — 59025 FETAL NON-STRESS TEST: CPT | Mod: 26

## 2023-06-11 PROCEDURE — G0463: CPT

## 2023-06-11 PROCEDURE — 59410 OBSTETRICAL CARE: CPT

## 2023-06-11 PROCEDURE — 59025 FETAL NON-STRESS TEST: CPT

## 2023-06-11 RX ORDER — SODIUM CHLORIDE 9 MG/ML
1000 INJECTION, SOLUTION INTRAVENOUS
Refills: 0 | Status: DISCONTINUED | OUTPATIENT
Start: 2023-06-11 | End: 2023-06-13

## 2023-06-11 RX ORDER — BENZOCAINE 10 %
1 GEL (GRAM) MUCOUS MEMBRANE EVERY 6 HOURS
Refills: 0 | Status: DISCONTINUED | OUTPATIENT
Start: 2023-06-11 | End: 2023-06-13

## 2023-06-11 RX ORDER — OXYTOCIN 10 UNIT/ML
333.33 VIAL (ML) INJECTION
Qty: 20 | Refills: 0 | Status: DISCONTINUED | OUTPATIENT
Start: 2023-06-11 | End: 2023-06-13

## 2023-06-11 RX ORDER — SIMETHICONE 80 MG/1
80 TABLET, CHEWABLE ORAL EVERY 4 HOURS
Refills: 0 | Status: DISCONTINUED | OUTPATIENT
Start: 2023-06-11 | End: 2023-06-13

## 2023-06-11 RX ORDER — OXYTOCIN 10 UNIT/ML
VIAL (ML) INJECTION
Qty: 30 | Refills: 0 | Status: DISCONTINUED | OUTPATIENT
Start: 2023-06-11 | End: 2023-06-12

## 2023-06-11 RX ORDER — LANOLIN
1 OINTMENT (GRAM) TOPICAL EVERY 6 HOURS
Refills: 0 | Status: DISCONTINUED | OUTPATIENT
Start: 2023-06-11 | End: 2023-06-13

## 2023-06-11 RX ORDER — DIBUCAINE 1 %
1 OINTMENT (GRAM) RECTAL EVERY 6 HOURS
Refills: 0 | Status: DISCONTINUED | OUTPATIENT
Start: 2023-06-11 | End: 2023-06-13

## 2023-06-11 RX ORDER — ACETAMINOPHEN 500 MG
975 TABLET ORAL
Refills: 0 | Status: DISCONTINUED | OUTPATIENT
Start: 2023-06-11 | End: 2023-06-13

## 2023-06-11 RX ORDER — AER TRAVELER 0.5 G/1
1 SOLUTION RECTAL; TOPICAL EVERY 4 HOURS
Refills: 0 | Status: DISCONTINUED | OUTPATIENT
Start: 2023-06-11 | End: 2023-06-13

## 2023-06-11 RX ORDER — OXYTOCIN 10 UNIT/ML
41.67 VIAL (ML) INJECTION
Qty: 20 | Refills: 0 | Status: DISCONTINUED | OUTPATIENT
Start: 2023-06-11 | End: 2023-06-13

## 2023-06-11 RX ORDER — HYDROCORTISONE 1 %
1 OINTMENT (GRAM) TOPICAL EVERY 6 HOURS
Refills: 0 | Status: DISCONTINUED | OUTPATIENT
Start: 2023-06-11 | End: 2023-06-13

## 2023-06-11 RX ORDER — SODIUM CHLORIDE 9 MG/ML
3 INJECTION INTRAMUSCULAR; INTRAVENOUS; SUBCUTANEOUS EVERY 8 HOURS
Refills: 0 | Status: DISCONTINUED | OUTPATIENT
Start: 2023-06-11 | End: 2023-06-13

## 2023-06-11 RX ORDER — SODIUM CHLORIDE 9 MG/ML
1000 INJECTION, SOLUTION INTRAVENOUS
Refills: 0 | Status: DISCONTINUED | OUTPATIENT
Start: 2023-06-11 | End: 2023-06-12

## 2023-06-11 RX ORDER — KETOROLAC TROMETHAMINE 30 MG/ML
30 SYRINGE (ML) INJECTION ONCE
Refills: 0 | Status: DISCONTINUED | OUTPATIENT
Start: 2023-06-11 | End: 2023-06-11

## 2023-06-11 RX ORDER — TETANUS TOXOID, REDUCED DIPHTHERIA TOXOID AND ACELLULAR PERTUSSIS VACCINE, ADSORBED 5; 2.5; 8; 8; 2.5 [IU]/.5ML; [IU]/.5ML; UG/.5ML; UG/.5ML; UG/.5ML
0.5 SUSPENSION INTRAMUSCULAR ONCE
Refills: 0 | Status: DISCONTINUED | OUTPATIENT
Start: 2023-06-11 | End: 2023-06-13

## 2023-06-11 RX ORDER — CHLORHEXIDINE GLUCONATE 213 G/1000ML
1 SOLUTION TOPICAL DAILY
Refills: 0 | Status: DISCONTINUED | OUTPATIENT
Start: 2023-06-11 | End: 2023-06-12

## 2023-06-11 RX ORDER — PRAMOXINE HYDROCHLORIDE 150 MG/15G
1 AEROSOL, FOAM RECTAL EVERY 4 HOURS
Refills: 0 | Status: DISCONTINUED | OUTPATIENT
Start: 2023-06-11 | End: 2023-06-13

## 2023-06-11 RX ORDER — OXYCODONE HYDROCHLORIDE 5 MG/1
5 TABLET ORAL
Refills: 0 | Status: DISCONTINUED | OUTPATIENT
Start: 2023-06-11 | End: 2023-06-13

## 2023-06-11 RX ORDER — DIPHENHYDRAMINE HCL 50 MG
25 CAPSULE ORAL EVERY 6 HOURS
Refills: 0 | Status: DISCONTINUED | OUTPATIENT
Start: 2023-06-11 | End: 2023-06-13

## 2023-06-11 RX ORDER — IBUPROFEN 200 MG
600 TABLET ORAL EVERY 6 HOURS
Refills: 0 | Status: COMPLETED | OUTPATIENT
Start: 2023-06-11 | End: 2024-05-09

## 2023-06-11 RX ORDER — MAGNESIUM HYDROXIDE 400 MG/1
30 TABLET, CHEWABLE ORAL
Refills: 0 | Status: DISCONTINUED | OUTPATIENT
Start: 2023-06-11 | End: 2023-06-13

## 2023-06-11 RX ORDER — OXYCODONE HYDROCHLORIDE 5 MG/1
5 TABLET ORAL ONCE
Refills: 0 | Status: DISCONTINUED | OUTPATIENT
Start: 2023-06-11 | End: 2023-06-13

## 2023-06-11 RX ORDER — CITRIC ACID/SODIUM CITRATE 300-500 MG
15 SOLUTION, ORAL ORAL EVERY 6 HOURS
Refills: 0 | Status: DISCONTINUED | OUTPATIENT
Start: 2023-06-11 | End: 2023-06-12

## 2023-06-11 RX ADMIN — Medication 30 MILLIGRAM(S): at 21:40

## 2023-06-11 RX ADMIN — SODIUM CHLORIDE 125 MILLILITER(S): 9 INJECTION, SOLUTION INTRAVENOUS at 19:01

## 2023-06-11 RX ADMIN — Medication 125 MILLIUNIT(S)/MIN: at 21:30

## 2023-06-11 RX ADMIN — Medication 2 MILLIUNIT(S)/MIN: at 18:44

## 2023-06-11 RX ADMIN — SODIUM CHLORIDE 3 MILLILITER(S): 9 INJECTION INTRAMUSCULAR; INTRAVENOUS; SUBCUTANEOUS at 21:30

## 2023-06-11 NOTE — OB RN TRIAGE NOTE - NS_FETALMOVEMENT_OBGYN_ALL_OB
no lesions,  no deformities,  no traumatic injuries,  no significant scars are present,  chest wall non-tender,  no masses present, breathing is unlabored without accessory muscle use,normal breath sounds
Present, unchanged

## 2023-06-11 NOTE — OB PROVIDER TRIAGE NOTE - NSOBPROVIDERNOTE_OBGYN_ALL_OB_FT
27yo  at 38w1d presenting for ROL. Tyler irregularly. Cervix 2cm. No other complaints. NST reactive.     - d/c home   - return precautions discussed  - cont routine follow up     Nilda Gonzales MD PGY3   d/w Dr. Jimenez

## 2023-06-11 NOTE — OB RN DELIVERY SUMMARY - NS_SEPSISRSKCALC_OBGYN_ALL_OB_FT
EOS calculated successfully. EOS Risk Factor: 0.5/1000 live births ([facility] national incidence); GA=38w1d; Temp=99.14; ROM=0.85; GBS='Negative'; Antibiotics='No antibiotics or any antibiotics < 2 hrs prior to birth'

## 2023-06-11 NOTE — OB RN PATIENT PROFILE - FALL HARM RISK - UNIVERSAL INTERVENTIONS
Bed in lowest position, wheels locked, appropriate side rails in place/Call bell, personal items and telephone in reach/Instruct patient to call for assistance before getting out of bed or chair/Non-slip footwear when patient is out of bed/[place] to call system/Physically safe environment - no spills, clutter or unnecessary equipment/Purposeful Proactive Rounding/Room/bathroom lighting operational, light cord in reach

## 2023-06-11 NOTE — OB RN TRIAGE NOTE - NS_CONTRACTSTART_OBGYN_ALL_OB_DT
11-Jun-2023 03:00

## 2023-06-11 NOTE — OB RN TRIAGE NOTE - FALL HARM RISK - UNIVERSAL INTERVENTIONS
Bed in lowest position, wheels locked, appropriate side rails in place/Call bell, personal items and telephone in reach/Instruct patient to call for assistance before getting out of bed or chair/Non-slip footwear when patient is out of bed/[place] to call system/Physically safe environment - no spills, clutter or unnecessary equipment/Purposeful Proactive Rounding/Room/bathroom lighting operational, light cord in reach

## 2023-06-11 NOTE — OB PROVIDER H&P - HISTORY OF PRESENT ILLNESS
R1 Admission H&P    Subjective  HPI: 28y  @ 38w1d presents for rule out labor. Had been seen in this triage earlier this morning and VE at that time 2.5cm dilated. continued CTX that are painful and more intense  +FM. -LOF. -VB. Pt denies any other concerns.    – PNC: Denies prenatal issues. GBS pos/neg. EFW 3175g by windy.  – OBHx: P0  – GynHx: denies fibroids, cysts, endometriosis, abnormal pap smears, STIs  – PMH: denies  – PSH: denies  – Psych: denies   – Social: denies   – Meds: PNV   – Allergies: NKDA  – Will accept blood transfusions? Yes

## 2023-06-11 NOTE — OB RN TRIAGE NOTE - NS_LABORDETAILS_OBGYN_ALL_OB
Reason for Call:  Form, our goal is to have forms completed with 72 hours, however, some forms may require a visit or additional information.    Type of letter, form or note:  Home Health Certification    Who is the form from?: Home care    Where did the form come from: Form was faxed    What clinic location was the form placed at?: [facility]    Where the form was placed: Given to LEVY Rivera    What number is listed as a contact on the form?: [phone]       Additional comments: [facility]    Call taken on 8/15/2022 at 2:26 PM by Kristin Jaimes         Back Pain

## 2023-06-11 NOTE — OB PROVIDER TRIAGE NOTE - NSHPPHYSICALEXAM_GEN_ALL_CORE
VS  T(C): 36.6 (06-11-23 @ 06:52)  HR: 83 (06-11-23 @ 06:53)  BP: 124/78 (06-11-23 @ 06:53)  RR: 18 (06-11-23 @ 06:52)  SpO2: 90% (06-11-23 @ 06:53)    Gen: Well appearing, NAD   Resp: nl work of breathing   Abd: Soft, nontender, gravid     VE: 2/70/-3  , mod vinay, +accels, -decels

## 2023-06-11 NOTE — OB PROVIDER H&P - NSLOWPPHRISK_OBGYN_A_OB
No previous uterine incision/[place] Pregnancy/Less than or equal to 4 previous vaginal births/No known bleeding disorder/No history of postpartum hemorrhage

## 2023-06-11 NOTE — OB PROVIDER H&P - NSHPPHYSICALEXAM_GEN_ALL_CORE
Objective  – VS  T(C): 37.3 (06-11-23 @ 16:38)  HR: 89 (06-11-23 @ 16:44)  BP: 139/82 (06-11-23 @ 16:38)  RR: 18 (06-11-23 @ 06:52)  SpO2: 100% (06-11-23 @ 16:44)    Physical Exam  CV: RRR  Pulm: breathing comfortably on RA  Abd: gravid, nontender  Extr: moving all extremities with ease    – Sono: vertex

## 2023-06-11 NOTE — OB RN DELIVERY SUMMARY - NSSELHIDDEN_OBGYN_ALL_OB_FT
[NS_DeliveryAttending1_OBGYN_ALL_OB_FT:[id number]=],[NS_DeliveryRN_OBGYN_ALL_OB_FT:[id number]=],[NS_CirculateRN2_OBGYN_ALL_OB_FT:[id number]=]

## 2023-06-11 NOTE — OB PROVIDER H&P - ASSESSMENT
Assessment  28y   @ 38w1d presents in early labor.     Plan  1. Admit to L+D. Routine Labs. IVF.  2. Expectant management/augmentation w/ pitocin.  3. Fetus: cat 1 tracing. VTX. EFW 3175g by sono. Continuous EFM. Sono. No concerns.  4. Prenatal issues: none  5. GBS neg  6. Pain: IV pain meds/epidural PRN    Plan per attending physician, Dr. Sg Melgar MD  PGY1

## 2023-06-11 NOTE — OB PROVIDER TRIAGE NOTE - NSPROVTRIAGESELHIDDEN_OBGYN_ALL_OB_FT
[NS_AttendInformed_OBGYN_ALL_OB:[id number]=]
Elective surgery  rectal surgery  History of cholecystectomy

## 2023-06-11 NOTE — OB PROVIDER LABOR PROGRESS NOTE - NS_DILATION_OBGYN_ALL_OB_NU
10 [As Noted in HPI] : as noted in HPI [Skin Lesions] : skin lesion [Negative] : Cardiovascular [de-identified] : sub epidermal bleed sub 5 right, prior hx does exist and we have in the past discussed permananent solution

## 2023-06-11 NOTE — OB PROVIDER TRIAGE NOTE - HISTORY OF PRESENT ILLNESS
R3 Triage Note     27yo  at 38w1d GA presenting for ROL. Patient endorses contractions every 5min since this AM. Had some brown spotting after VE in office, no further bleeding. Denies LOF. Good fetal movement. 1cm in office this week.     allergies: nkda  meds: PNV  PNC: uncomplicated    Medhx: pt denies cardiac, pulm, heme, GI, neuro  OBhx: current  Sxhx: pt denies   Gynhx: pt denies cysts, fibroids, abn. pap, STDs  Sochx: pt denies  Famhx: pt denies

## 2023-06-11 NOTE — OB RN TRIAGE NOTE - CURRENT PREGNANCY COMPLICATIONS, OB PROFILE
Patient slightly elevated CR  Given slightly elevated BNP, will not start IV fluids at this time but will also not give diuretics.  PO intake per patient   None

## 2023-06-11 NOTE — OB PROVIDER DELIVERY SUMMARY - NSPROVIDERDELIVERYNOTE_OBGYN_ALL_OB_FT
of live female infant, apgars 9, 9 over intact perineum.  Clear Af.  baby's nose and mouth bulb suctioned at perineum.  Shoulders delivered spont intact.  placenta delivered spont intact.  1st deg lac repaired with 2-0, 3-0 vicryl in usual fashion.  EBL 200cc,  Cords gases obtained.  Uterus, cervix, vagina, rectum intact.  Pt with  in stable condition.    Brionna Bettencourt M.D.

## 2023-06-12 ENCOUNTER — TRANSCRIPTION ENCOUNTER (OUTPATIENT)
Age: 28
End: 2023-06-12

## 2023-06-12 LAB
COVID-19 SPIKE DOMAIN AB INTERP: POSITIVE
COVID-19 SPIKE DOMAIN ANTIBODY RESULT: >250 U/ML — HIGH
SARS-COV-2 IGG+IGM SERPL QL IA: >250 U/ML — HIGH
SARS-COV-2 IGG+IGM SERPL QL IA: POSITIVE
T PALLIDUM AB TITR SER: NEGATIVE — SIGNIFICANT CHANGE UP

## 2023-06-12 RX ORDER — IBUPROFEN 200 MG
1 TABLET ORAL
Qty: 0 | Refills: 0 | DISCHARGE
Start: 2023-06-12

## 2023-06-12 RX ORDER — IBUPROFEN 200 MG
600 TABLET ORAL EVERY 6 HOURS
Refills: 0 | Status: DISCONTINUED | OUTPATIENT
Start: 2023-06-12 | End: 2023-06-13

## 2023-06-12 RX ADMIN — Medication 975 MILLIGRAM(S): at 13:39

## 2023-06-12 RX ADMIN — Medication 975 MILLIGRAM(S): at 05:25

## 2023-06-12 RX ADMIN — SODIUM CHLORIDE 3 MILLILITER(S): 9 INJECTION INTRAMUSCULAR; INTRAVENOUS; SUBCUTANEOUS at 06:38

## 2023-06-12 RX ADMIN — Medication 975 MILLIGRAM(S): at 13:09

## 2023-06-12 RX ADMIN — Medication 975 MILLIGRAM(S): at 21:50

## 2023-06-12 RX ADMIN — Medication 975 MILLIGRAM(S): at 21:19

## 2023-06-12 RX ADMIN — Medication 1 TABLET(S): at 13:09

## 2023-06-12 RX ADMIN — Medication 975 MILLIGRAM(S): at 04:23

## 2023-06-12 RX ADMIN — Medication 600 MILLIGRAM(S): at 17:58

## 2023-06-12 RX ADMIN — Medication 600 MILLIGRAM(S): at 18:28

## 2023-06-12 NOTE — PROGRESS NOTE ADULT - SUBJECTIVE AND OBJECTIVE BOX
S: Patient doing well. Minimal lochia. Pain controlled.    O: Vital Signs Last 24 Hrs  T(C): 37.2 (2023 09:23), Max: 37.3 (2023 16:38)  T(F): 99 (2023 09:23), Max: 99.14 (2023 16:38)  HR: 81 (2023 09:23) (62 - 113)  BP: 97/65 (2023 09:23) (97/65 - 139/82)  BP(mean): 81 (2023 00:00) (80 - 98)  RR: 18 (2023 09:23) (16 - 18)  SpO2: 97% (2023 09:23) (94% - 100%)    Parameters below as of 2023 09:23  Patient On (Oxygen Delivery Method): room air        Gen: NAD  Abd: soft, NT, ND, fundus firm below umbilicus  Lochia: normal  Ext: no tenderness    Labs:                        13.1   13.97 )-----------( 177      ( 2023 18:10 )             39.3       A: 28y PPD#1 s/p  doing well.    Plan: oob, will follow, d-c in am if stable
Postpartum Note- PPD#1    Allergies    No Known Allergies    Intolerances        Prenatal labs:  Rubella IgG:  Immune         RPR:  Pending           Blood Type: B+    S:Patient is a  28y   G  1  P  1    PPD#1         S/P      Patient w/o complaints, pain is controlled.    Pt is OOB, tolerating PO, passing flatus. Lochia WNL.   Feeding: Breastfeeding    O:  Vital Signs Last 24 Hrs  T(C): 36.8 (2023 05:38), Max: 37.3 (2023 16:38)  T(F): 98.3 (2023 05:38), Max: 99.14 (2023 16:38)  HR: 91 (2023 05:38) (62 - 113)  BP: 102/64 (2023 05:38) (101/66 - 139/82)  BP(mean): 81 (2023 00:00) (80 - 98)  RR: 18 (2023 05:38) (16 - 18)  SpO2: 97% (2023 05:38) (94% - 100%)    Parameters below as of 2023 05:38  Patient On (Oxygen Delivery Method): room air         Gen: NAD  CV: rrr s1s2, CTABL  Abdomen: Soft, nontender, non-distended, fundus firm.  Lochia: WNL  Perineum: first degree laceration  Ext: Neg edema, Neg calf tenderness.  Pedal pulses palpated B/L    LABS:    Hemoglobin: 13.1 g/dL ( @ 18:10)      Hematocrit: 39.3 % ( @ 18:10)      A/P:  28y  PPD # 1      S/P    ,    doing well    PMHx: none  Current Issues: none    Increase OOB  Regular diet  PO Pain protocol  AM H&H  Routine Postpartum Care

## 2023-06-12 NOTE — DISCHARGE NOTE OB - CARE PROVIDER_API CALL
Brionna Bettencourt  Obstetrics and Gynecology  [address] [facility] [address], [address]  [place] [address]  Phone: [phone]  Fax: [phone]  Follow Up Time:

## 2023-06-12 NOTE — DISCHARGE NOTE OB - PATIENT PORTAL LINK FT
You can access the FollowMyHealth Patient Portal offered by [facility] by registering at the following website: [email or web address]. By joining [facility]’s FollowMyHealth portal, you will also be able to view your health information using other applications (apps) compatible with our system.

## 2023-06-12 NOTE — DISCHARGE NOTE OB - MEDICATION SUMMARY - MEDICATIONS TO TAKE
I will START or STAY ON the medications listed below when I get home from the hospital:    ibuprofen 600 mg oral tablet  -- 1 tab(s) by mouth every 6 hours  -- Indication: For Supervision of other normal pregnancy, antepartum    Prenatal Multivitamins with Folic Acid 1 mg oral tablet  -- 1 tab(s) by mouth once a day  -- Indication: For Supervision of other normal pregnancy, antepartum

## 2023-06-12 NOTE — DISCHARGE NOTE OB - NS MD DC FALL RISK RISK
For information on Fall & Injury Prevention, visit: [email or web address] OR  [email or web address] OR  https://www.cdc.gov/steadi/patient.html

## 2023-06-13 VITALS
SYSTOLIC BLOOD PRESSURE: 105 MMHG | RESPIRATION RATE: 18 BRPM | OXYGEN SATURATION: 98 % | HEART RATE: 68 BPM | DIASTOLIC BLOOD PRESSURE: 70 MMHG | TEMPERATURE: 98 F

## 2023-06-13 DIAGNOSIS — O26.853 SPOTTING COMPLICATING PREGNANCY, THIRD TRIMESTER: ICD-10-CM

## 2023-06-13 DIAGNOSIS — Z3A.38 38 WEEKS GESTATION OF PREGNANCY: ICD-10-CM

## 2023-06-13 DIAGNOSIS — O47.1 FALSE LABOR AT OR AFTER 37 COMPLETED WEEKS OF GESTATION: ICD-10-CM

## 2023-06-13 PROCEDURE — 86780 TREPONEMA PALLIDUM: CPT

## 2023-06-13 PROCEDURE — 59050 FETAL MONITOR W/REPORT: CPT

## 2023-06-13 PROCEDURE — 86769 SARS-COV-2 COVID-19 ANTIBODY: CPT

## 2023-06-13 PROCEDURE — 87340 HEPATITIS B SURFACE AG IA: CPT

## 2023-06-13 PROCEDURE — 86850 RBC ANTIBODY SCREEN: CPT

## 2023-06-13 PROCEDURE — 86901 BLOOD TYPING SEROLOGIC RH(D): CPT

## 2023-06-13 PROCEDURE — 85025 COMPLETE CBC W/AUTO DIFF WBC: CPT

## 2023-06-13 PROCEDURE — 86900 BLOOD TYPING SEROLOGIC ABO: CPT

## 2023-06-13 RX ADMIN — Medication 975 MILLIGRAM(S): at 09:09

## 2023-06-13 RX ADMIN — Medication 1 TABLET(S): at 12:33

## 2023-06-13 RX ADMIN — Medication 600 MILLIGRAM(S): at 13:30

## 2023-06-13 RX ADMIN — Medication 975 MILLIGRAM(S): at 15:12

## 2023-06-13 RX ADMIN — Medication 600 MILLIGRAM(S): at 06:50

## 2023-06-13 RX ADMIN — Medication 600 MILLIGRAM(S): at 00:25

## 2023-06-13 RX ADMIN — Medication 600 MILLIGRAM(S): at 01:00

## 2023-06-13 RX ADMIN — Medication 975 MILLIGRAM(S): at 10:00

## 2023-06-13 RX ADMIN — Medication 600 MILLIGRAM(S): at 06:18

## 2023-06-13 RX ADMIN — Medication 600 MILLIGRAM(S): at 12:33

## 2023-06-14 ENCOUNTER — APPOINTMENT (OUTPATIENT)
Dept: OBGYN | Facility: CLINIC | Age: 28
End: 2023-06-14

## 2023-06-21 ENCOUNTER — APPOINTMENT (OUTPATIENT)
Dept: OBGYN | Facility: CLINIC | Age: 28
End: 2023-06-21

## 2023-06-23 ENCOUNTER — NON-APPOINTMENT (OUTPATIENT)
Age: 28
End: 2023-06-23

## 2023-07-27 ENCOUNTER — NON-APPOINTMENT (OUTPATIENT)
Age: 28
End: 2023-07-27

## 2023-07-27 ENCOUNTER — APPOINTMENT (OUTPATIENT)
Dept: OBGYN | Facility: CLINIC | Age: 28
End: 2023-07-27
Payer: COMMERCIAL

## 2023-07-27 VITALS
WEIGHT: 172 LBS | SYSTOLIC BLOOD PRESSURE: 128 MMHG | BODY MASS INDEX: 30.48 KG/M2 | HEIGHT: 63 IN | DIASTOLIC BLOOD PRESSURE: 84 MMHG

## 2023-07-27 PROCEDURE — 0503F POSTPARTUM CARE VISIT: CPT

## 2023-07-31 LAB
CYTOLOGY CVX/VAG DOC THIN PREP: NORMAL
HPV HIGH+LOW RISK DNA PNL CVX: NOT DETECTED

## 2023-09-27 ENCOUNTER — NON-APPOINTMENT (OUTPATIENT)
Age: 28
End: 2023-09-27

## 2023-11-06 NOTE — OB RN PATIENT PROFILE - NSPRENATALGBS_OBGYN_ALL_OB_START_DATE
Her iron was so low that I do not recommend discontinuing it at this time.  The gastroenterologist who ordered the endoscopy should be reaching out to her to discuss the results.  I recommend she speak with him about the ongoing need for iron based off of her results. Thanks.    24-May-2023

## 2024-01-30 ENCOUNTER — NON-APPOINTMENT (OUTPATIENT)
Age: 29
End: 2024-01-30

## 2024-06-27 NOTE — OB RN TRIAGE NOTE - COMFORT/ACCEPTABLE PAIN LEVEL (0-10)
[FreeTextEntry1] : #Breast Cancer - s/p neoadjuvant TCHP followed by b/l mastectomy with node dissection negative for residual disease, pCR - s/p 12 doses of Herceptin/Perjeta - PET/CT 1/2021 negative for any evidence of metastases - Completed 1 year of Nerlynx 5/2021 - s/p bx in 11/2021 revealing fat necrosis - Started AI 2019 and switched to Letrozole with monthly Zoladex. She continues to experience persistent stable joint pains diffusely. Patient declines switching to another AI. - s/p CT CAP 12/16/22 no evidence of malignancy with subcentimeter liver lesions - s/p MRI breast 1/2023 BIRADS 2 - s/p MRI Abd 5/2023 no suspicious liver lesions. several scattered T2 hyperintense foci representing cysts and hemangiomas. Largest 9mm. Reviewed. - 6/6/23 vs and CBC reviewed; WBC 4.84, hgb 13.8, plt 245. Stable complaints on Letrozole. Continues to decline switching AI. Continue Letrozole and monthly Zoladex. Due for Zoladex today; proceed. Continue follow up with Dr. Combs, last seen 5/2023, note reviewed. No need for breast imaging. Continue clinical breast exams. - 7/11/23 vs and CBC reviewed; WBC 5.46, hgb 14.1, plt 284. Continue Letrozole and Zoladex. Due for Zoladex today. Patient presents with similar complaints of joint pains, hair loss stable and does not wish to switch AIs or take a break. - 8/8/23 - vs reviewed. labs drawn in office today. wbc 4.32, hgb 13.3, plt 261. continue zoladex and letrozole. complaining of L sided pulling, concerned about implant - will dw Dr. Santana and call Dr. Combs if needed. If it continues will check US of L breast to check on implant. no palpable abnormality noted. - 9/5/23 - vs and labs reviewed. labs drawn in office today. wbc 5.11, hgb 13.9, plts 284.Continue Letrozole and Zoladex. Due for Zoladex today. Patient presents with similar complaints of joint pains, hair loss stable and does not wish to switch AIs or take a break. - 10/10/23 vs and CBC reviewed; WBC 4.59, hgb 13.8, plt 264. Continues on Letrozole and monthly Zoladex. 1 week delayed for Zoladex. She continues to note same systemic complaints. Again discussed switching AIs or taking drug holiday. Patient hesitant and prefers to stay on Letrozole. Has US L breast implant 10/18/23. -11/7/23 - vs and labs reviewed. labs drawn in office today. continue letrozole, calcium and vit D. US reviewed - no evidence of abnormality. If breast pain continues will do MRI - 12/5/23 CBC reviewed; WBC 4.97, hgb 13.7, plt 268. Remains on Letrozole. Stable complaints and does not want to switch. Due for Zoladex today. s/p US L breast with persistent pain and now pain to R side. Will get bilateral MRI breast to assess implants  - 1/30/24 - vs and labs reviewed. cbc reviewed. rescheduled for breast MRI 2/15/24. MRI brain - Unremarkable exam. 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 due for DEXA 3/7/24 - 2/27/24 vs and CBC reivewed; CBC wnl. Continue on Letrozole and Zoladex. Due today. She is s/p MRI brain negative. Since last visit she had 3 skin bx, to back, axilla R, and L breast with derm Dr. Joon Clay path reveals L inframammary combined lentiginous and melanocytic nevus and pigmented SK, Lower back juntional melanocytic nevus similar to clarks nevus, R axilla lentiginous melanocytic nevus. Follow up with derm. She states they then got inected and she had 2 rounds of doxy. She then got COVID s/p paxlovid and then flu s/p tamiflu, prednisone and albuterol and stopped letrozole during that time. She is scheduled for MRI breast 3/2024. Slight pink tinge to L breast, no open wounds, does not appear to look cellulitic. However she did have a needle bx to inframmary fold, ensure no leak to breast implant. MRI ASAP. She is scheduled 3/14/24,  Tricia was tasked to call and get appt sooner  - 4/42/24 vs and CBC reviewed; WBC 6.67, hgb 14.3, plt 300. Continue on Letrozole and Zoladex. Due today.  s/p MRI breast 3/2024 revealing Status post bilateral mastectomy with silicone implant reconstruction with complex radial folds and no MRI evidence of intra or extracapsular implant rupture. No MRI evidence of malignancy. Any further management of the patient's complaints of pain and a palpable lump, including decision to biopsy, should be based on clinical assessment. Please note, the patient reports right rib pain. For evaluation of a rib abnormality, plain film, bone scan and if needed CAT scan can be performed for further evaluation as clinically warranted. She also has followed with ENT Dr. Muller many US of cervical LN and then had PET/CT 3/2024 revealing focal hypermetabolic corresponding to soft tissue fullness about the nasopharyngeal soft tissues and tongue base/lingual soft tissues.  Unclear if this is inflammatory versus other process. Recommend direct visualization tissue sampling if indicated. Seeing Dr. Muller today for possible scope.  - 4/24 - vs and labs reviewed. wbc, hgb and plts wnl. saw Dr. Muller - put on zpack. seeing again 5/24. scoped the last time she was with him. continue zoladex and letrozole.  - 5/28/24 vs and CBC reviewed. Remains on Letrozole and Zoladex. Due today. s/p follow up with Dr. Combs s/p MRI breast notes reviewed and imaging reviewed. States after she saw Dr. Combs noticed "bubble/bump" to R 6:00 breast implant. On PE appears to be fold in implant and edge of implant. Will continue to monitor. All imaging negative. She is seeing us monthly per her preference.  6/24 - vs and labs reviewed. continue with zoladex and letrozole. Saw Dr. Combs on 5/13/24 - reviewed note.  #Nevi - Had 3 skin bx, to back, axilla R, and L breast with derm Dr. Joon Clay path reveals L inframammary combined lentiginous and melanocytic nevus and pigmented SK, Lower back junctional melanocytic nevus similar to clarks nevus, R axilla lentiginous melanocytic nevus. Follow up with derm. She states they then got infected and she had 2 rounds of doxy. She is scheduled for MRI breast 3/2024. Slight pink tinge to L breast, no open wounds, does not appear to look cellulitic. However she did have a needle bx to inframmary fold, ensure no leak to breast implant. MRI ASAP. She is scheduled 3/14/24,  Tricia was tasked to call and get appt sooner  - 4/2/24 Going  back to derm Dr. Clay for excision of back nevus - 4/30/24 - continue to follow with derm - 5/28/24 was told repeat was negative   #Back pain - XR no suspicious lytic or blastic lesions - MRI L spine 12/7/22 revealing disc protrusion - Again advised follow up with Neurology. Has not done so  #Lymphedema - Re-advised will benefit from PT - Patient has not gone  #Bone Density - DEXA 3/2024 L spine T score -1.3, L femoral neck T score -0.2, L femur T score -0.4. R hip -0.3, R fem neck -0.6. Reviewed compared to 3/2022 minor decrease to bone density to L spine  - Again reviewed monitoring of DEXA q2y with AI use and risk of bone thinning - Continue ca++, vit D and weight bearing exercises. She has not started ca++. Advised 1200mg daily - due 3/2026  #Joint pains - 2/2 AI or Zoladex. Patient declines switching AI. - Continue follow up with Rheum/Neuro/PCP - 6/6/23 stable. Patient does not wish to switch AI or introduce other medications. - 7/11/23 stable pains. Again offered to switch AI or take drug holiday. Patient does not wish to. - 8/8/23 - Continue Letrozole. stable pains - 10/10/23 as above, continues to note systemic complaints does not want to switch AI or take drug holiday - 11/7/23 - stable - 12/5/23 stable, does not want to switch AI  - 1/30/24 - stable, does not want to switch AI  - 2/27/24 stable and does not want to switch AI  - 4/2/24 stable and does not want to switch AI  - 4/30/24 -  stable and does not want to switch AI  - 5/28/24 stable does not want to switch AI   #Fatigue - Likely 2/2 lingering s/s of recent viral symptoms and/or AI - Continue to monitor iron studies, B12/folate, TFTs  #Abd Pain/Liver Lesions - s/p CT CAP 12/2022 negative. Noted subcentimeter liver lesions. - Follow up MRI abd for assessment of liver lesions 5/2023 shows 9mm cyst and 2 hemangiomas.Otherwise, no liver lesions. LFT's/ ALK Phos normal. - 5/4/23 MRI abdomen ( [facility])- no suspicious liver lesions. several scattered T2 hyperintense foci representing cysts and hemangiomas. largest 9mm. consider repeat in 1 year to ensure stability. Due 5/2024.  - 4/2/24 of note did have PET/CT 3/2024 PET/CT 3/2024 revealing focal hypermetabolic corresponding to soft tissue fullness about the nasopharyngeal soft tissues and tongue base/lingual soft tissues.  Unclear if this is inflammatory versus other process. Recommend direct visualization tissue sampling if indicated. There was no hypermetabolic activity in chest abd or pelvis  - 5/28/24 last MRI abd 5/2023. Considr 1 yr repeat. Did have recent pet/CT no activity abd   #Intermittent HA - Endorses intermittent HA occipital and around eye - No associated neuro s/s - If persistent or worsens can consider MRI brain. Patient hesitant - Continue follow up with Neuro. Due for appt. Has not gone. - 12/5/23 again reviewed to follow up with neuro. HAs persistent and she states she is tracking them. Will order MRI Head - 1/30/24 - reviewed MRI brain from 1/22/24- Unremarkable exam. 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 - 5/28/24 seeing neuro 7/2024   #Diarrhea - Worse in morning - Patient attributes to Letrozole use - Imodium PRN - BRAT diet - s/p eval with Dr. Verdugo 5/24/23, note reviewed. Scheduled for [facility] 10/2023. Celiac testing completed. - s/p CNY 10/2023 still needs to leave stool sample. Patient reports she was advised Dr. Verdugo will be leaving. Given names of other GIs in the practice - still needs to follow with another GI   #Hair thinning - 2/2 AI use with Letrozole - Offered Minoxidil 2.5mg PO. Patient declines.  #Weight Gain - 2/2 AI use. Encouraged walking and exercise. Patient does not wish to switch AI.  #Recurrent Infections - IgG wnl.  #Sleep Apnea - s/p sleep study confirmed sleep apnea, discussing CPAP  - follows with Dr Richard Fernandez   #Health Maintenance - UroGYN: Dr. Carranza continues on ppx nitrofurantoin on and off, currently off. Needs to make follow up. Also taking cranberry - CNY 10/2023 - needs to find GI - PCP Dr. Barrios. Continue follow up as instructed. - Neuro Dr. Cox. appt 7/2024  - Dr. Muller ENT has repeat scope scheduled today s/p abx and s/p eval with PET as above   RTC in 1 month with next monthly Zoladex with CBC with diff, CMP, UA, irons, b12/folate, vit D, TSH, FT4, Again reiterated and Discussed with patient spacing out appts for RPA q3mos. Patient wishes to come monthly for RPA with monthly Zoladex injections. 3

## 2024-10-01 NOTE — OB RN DELIVERY SUMMARY - BABY A: APGAR 5 MIN SCORE, DELIVERY
Pt reports she was seen and treated for her cold. Pt states her cough has become loose and productive and now she has post nasal drip. Pt states she is only on prednisone and feels she needs an abx at this point.  Pt gives permission for medical team to leave detailed message.  PCP please call pt back to further advise.   9